# Patient Record
Sex: FEMALE | URBAN - METROPOLITAN AREA
[De-identification: names, ages, dates, MRNs, and addresses within clinical notes are randomized per-mention and may not be internally consistent; named-entity substitution may affect disease eponyms.]

---

## 2021-10-25 ENCOUNTER — HISTORICAL (OUTPATIENT)
Dept: ADMINISTRATIVE | Facility: HOSPITAL | Age: 51
End: 2021-10-25

## 2021-10-25 LAB
ABS NEUT (OLG): 1.92 X10(3)/MCL (ref 2.1–9.2)
ALBUMIN SERPL-MCNC: 3.5 GM/DL (ref 3.5–5)
ALBUMIN/GLOB SERPL: 1.1 RATIO (ref 1.1–2)
ALP SERPL-CCNC: 91 UNIT/L (ref 40–150)
ALT SERPL-CCNC: 11 UNIT/L (ref 0–55)
AST SERPL-CCNC: 15 UNIT/L (ref 5–34)
BASOPHILS # BLD AUTO: 0 X10(3)/MCL (ref 0–0.2)
BASOPHILS NFR BLD AUTO: 1 %
BILIRUB SERPL-MCNC: 0.2 MG/DL
BILIRUBIN DIRECT+TOT PNL SERPL-MCNC: 0.1 MG/DL (ref 0–0.5)
BILIRUBIN DIRECT+TOT PNL SERPL-MCNC: 0.1 MG/DL (ref 0–0.8)
BUN SERPL-MCNC: 10.6 MG/DL (ref 9.8–20.1)
CALCIUM SERPL-MCNC: 8.9 MG/DL (ref 8.7–10.5)
CHLORIDE SERPL-SCNC: 108 MMOL/L (ref 98–107)
CHOLEST SERPL-MCNC: 164 MG/DL
CHOLEST/HDLC SERPL: 3 {RATIO} (ref 0–5)
CO2 SERPL-SCNC: 28 MMOL/L (ref 22–29)
CREAT SERPL-MCNC: 0.73 MG/DL (ref 0.55–1.02)
DEPRECATED CALCIDIOL+CALCIFEROL SERPL-MC: 26 NG/ML (ref 30–80)
EOSINOPHIL # BLD AUTO: 0.1 X10(3)/MCL (ref 0–0.9)
EOSINOPHIL NFR BLD AUTO: 3 %
ERYTHROCYTE [DISTWIDTH] IN BLOOD BY AUTOMATED COUNT: 13.3 % (ref 11.5–14.5)
EST CREAT CLEARANCE SER (OHS): 69.39 ML/MIN
EST. AVERAGE GLUCOSE BLD GHB EST-MCNC: 105.4 MG/DL
GLOBULIN SER-MCNC: 3.2 GM/DL (ref 2.4–3.5)
GLUCOSE SERPL-MCNC: 63 MG/DL (ref 74–100)
HBA1C MFR BLD: 5.3 %
HCT VFR BLD AUTO: 41.9 % (ref 35–46)
HDLC SERPL-MCNC: 56 MG/DL (ref 35–60)
HGB BLD-MCNC: 13.4 GM/DL (ref 12–16)
LDLC SERPL CALC-MCNC: 88 MG/DL (ref 50–140)
LYMPHOCYTES # BLD AUTO: 1.9 X10(3)/MCL (ref 0.6–4.6)
LYMPHOCYTES NFR BLD AUTO: 42 %
MCH RBC QN AUTO: 29.5 PG (ref 26–34)
MCHC RBC AUTO-ENTMCNC: 32 GM/DL (ref 31–37)
MCV RBC AUTO: 92.1 FL (ref 80–100)
MONOCYTES # BLD AUTO: 0.6 X10(3)/MCL (ref 0.1–1.3)
MONOCYTES NFR BLD AUTO: 13 %
NEUTROPHILS # BLD AUTO: 1.92 X10(3)/MCL (ref 2.1–9.2)
NEUTROPHILS NFR BLD AUTO: 42 %
NRBC BLD AUTO-RTO: 0 % (ref 0–0.2)
PLATELET # BLD AUTO: 214 X10(3)/MCL (ref 130–400)
PMV BLD AUTO: 10.9 FL (ref 7.4–10.4)
POTASSIUM SERPL-SCNC: 4.3 MMOL/L (ref 3.5–5.1)
PROT SERPL-MCNC: 6.7 GM/DL (ref 6.4–8.3)
RBC # BLD AUTO: 4.55 X10(6)/MCL (ref 4–5.2)
SODIUM SERPL-SCNC: 140 MMOL/L (ref 136–145)
T4 FREE SERPL-MCNC: 0.74 NG/DL (ref 0.7–1.48)
TRIGL SERPL-MCNC: 99 MG/DL (ref 37–140)
TSH SERPL-ACNC: 1.34 UIU/ML (ref 0.35–4.94)
VLDLC SERPL CALC-MCNC: 20 MG/DL
WBC # SPEC AUTO: 4.6 X10(3)/MCL (ref 4.5–11)

## 2022-04-10 ENCOUNTER — HISTORICAL (OUTPATIENT)
Dept: ADMINISTRATIVE | Facility: HOSPITAL | Age: 52
End: 2022-04-10

## 2022-04-26 VITALS
OXYGEN SATURATION: 98 % | SYSTOLIC BLOOD PRESSURE: 120 MMHG | HEIGHT: 61 IN | DIASTOLIC BLOOD PRESSURE: 71 MMHG | BODY MASS INDEX: 40.2 KG/M2 | WEIGHT: 212.94 LBS

## 2022-05-03 NOTE — HISTORICAL [facility] CERNER
This is a historical note converted from [facility]. Formatting and pictures may have been removed.  Please reference [facility] for original formatting and attached multimedia. Chief Complaint  EST pcp, Tdap, Shingrix  History of Present Illness  51-year-old?[place]-speaking female presents to clinic to establish PCP   line used for duration of interview and physical  ?  ?  Asthma  States that she is originally from [place] and has not had any asthma attacks since moving to this country.  Was diagnosed with asthma as a child and had an albuterol inhaler  Would like to have inhaler to keep on hand just in case  ?  Hemorrhoids  Follows with Dr. Chato Lozada and had recent C-scope  Does have family history of colon cancer with her father having passed away at 83 from colon cancer  States that she is on MiraLAX as needed constipation?prescribed by Dr. Lozada  ?  Minor swelling on ankles  Noticed it several years ago?swelling is?often noticed after she has been sitting down all day at her job  No shortness of breath, no chest pain, not know if swelling relieves when she elevates her feet  States that she would like a checkup as she has not been to a doctor for checkup in several years  Did have question if there was a test for tuberculosis or a vaccine  Denies any current or former exposure to?tuberculosis  And declined PPD test today  ?  Obesity  Patient states that she has started to walk after work  Is trying to eat better  Would like referral to medical nutrition therapy  ?  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  HM  Shingles vaccine 10/25/2021 second dose in two to 6 months  Tetanus vaccine today  Review of Systems  Constitutional: no fever, no chills, no weight loss  CV: , no edema, no chest pain, no  palpitations  ENT: no cough, no nasal congestion  :?No dysuria  GI: no constipation, no diarrhea, no nausea, no vomiting  Resp: no SOB, no wheezing, no difficulty breathing  Skin: no rash, no wounds, no discolorations  Neuro: no numbness/tingling, , no syncope  MSK: , no limb deformities, no gait disturbances, no neck pain  Psych: no depression, no anxiety  Physical Exam  Vitals & Measurements  T:?36.6? ?C (Oral)? HR:?79(Peripheral)? RR:?20? BP:?123/83? SpO2:?99%?  HT:?156.00?cm? WT:?96.000?kg? BMI:?39.45? LMP:?10/02/2021 00:00 CDT?  General-well appearing, NAD, wearing mask  Eye_- PERRL, EOMI, no scleral icterus  ENMT-deferred  Respiratory- CTAB, no rhonchi or stridor noted  Cardiovascular- RRR, no murmurs auscultated. non pitting edema to bilateral ankles only on lateral malleolus  Gastrointestinal- abdomen soft, Non tender, +BS  Genitourinary- deferred  Hema/Lymph- no lymphadenopathy noted in auricular, axillary  Musculoskeletal-+AROM  Integumentary- warm, dry ,intact  Neurologic- cooperative  Assessment/Plan  Immunization due?Z23,?Immunization due?Z23,?Immunization due?Z23,?Immunization due?Z23,?Immunization due?Z23,?Immunization due?Z23,?Immunization due?Z23,?Immunization due?Z23  ?Shingles #1 and tetanus vaccine today  Ordered:  tetanus/diphth/pertuss (Tdap) adult/adol, 0.5 mL, form: Injection, IM, Once-Unscheduled, first dose 10/25/21 14:43:00 CDT  CBC w/ Auto Diff, Routine collect, 10/25/21 15:28:00 CDT, Blood, Stop date 10/25/21 15:28:00 CDT, Lab Collect, Venous Draw, Immunization due  Obesity (BMI 30-39.9)  Wellness examination, 10/25/21 14:42:00 CDT  Clinic Follow up, *Est. 11/25/21 3:00:00 CST, lab results, Order for future visit, Immunization due  Obesity (BMI 30-39.9)  Wellness examination, [facility]  Comprehensive Metabolic Panel, Routine collect, 10/25/21 15:28:00 CDT, Blood, Stop date 10/25/21 15:28:00 CDT, Lab Collect, Venous Draw, Immunization due  Obesity (BMI 30-39.9)   Wellness examination, 10/25/21 14:42:00 CDT  Free T4, Routine collect, 10/25/21 15:28:00 CDT, Blood, Stop date 10/25/21 15:28:00 CDT, Lab Collect, Venous Draw, Immunization due  Obesity (BMI 30-39.9)  Wellness examination, 10/25/21 14:42:00 CDT  Hemoglobin A1C UHC, Routine collect, 10/25/21 15:28:00 CDT, Blood, Stop date 10/25/21 15:28:00 CDT, Lab Collect, Venous Draw, Immunization due  Obesity (BMI 30-39.9)  Wellness examination, 10/25/21 14:42:00 CDT  Lipid Panel, Routine collect, 10/25/21 15:28:00 CDT, Blood, Stop date 10/25/21 15:28:00 CDT, Lab Collect, Venous Draw, Immunization due  Obesity (BMI 30-39.9)  Wellness examination, 10/25/21 14:42:00 CDT  Thyroid Stimulating Hormone, Routine collect, 10/25/21 15:28:00 CDT, Blood, Stop date 10/25/21 15:28:00 CDT, Lab Collect, Venous Draw, Immunization due  Obesity (BMI 30-39.9)  Wellness examination, 10/25/21 14:42:00 CDT  Vaccines Add, 10/25/21 15:19:00 CDT, [facility], Routine, 10/25/21 15:19:00 CDT, Immunization due  Vaccines initial, 10/25/21 15:18:00 CDT, [facility], Routine, 10/25/21 15:18:00 CDT, Immunization due  Obesity (BMI 30-39.9)  Wellness examination  Vitamin D, 25-Hydroxy Level, Routine collect, 10/25/21 15:28:00 CDT, Blood, Stop date 10/25/21 15:28:00 CDT, Lab Collect, Venous Draw, Immunization due  Obesity (BMI 30-39.9)  Wellness examination, 10/25/21 14:42:00 CDT  ?  Obesity (BMI 30-39.9)?E66.9,?Obesity (BMI 30-39.9)?E66.9,?Obesity (BMI 30-39.9)?E66.9,?Obesity (BMI 30-39.9)?E66.9,?Obesity (BMI 30-39.9)?E66.9,?Obesity (BMI 30-39.9)?E66.9,?Obesity (BMI 30-39.9)?E66.9,?Obesity (BMI 30-39.9)?E66.9  ?A1c, lipid panel, TSH, T4, CMP  Referral placed to medical nutrition therapy  Advise diet and continued exercise  Ordered:  CBC w/ Auto Diff, Routine collect, 10/25/21 15:28:00 CDT, Blood, Stop date 10/25/21 15:28:00 CDT, Lab Collect, Venous Draw, Immunization due  Obesity (BMI 30-39.9)  Wellness examination, 10/25/21  14:42:00 CDT  Clinic Follow up, *Est. 11/25/21 3:00:00 CST, lab results, Order for future visit, Immunization due  Obesity (BMI 30-39.9)  Wellness examination, [facility]  Comprehensive Metabolic Panel, Routine collect, 10/25/21 15:28:00 CDT, Blood, Stop date 10/25/21 15:28:00 CDT, Lab Collect, Venous Draw, Immunization due  Obesity (BMI 30-39.9)  Wellness examination, 10/25/21 14:42:00 CDT  Free T4, Routine collect, 10/25/21 15:28:00 CDT, Blood, Stop date 10/25/21 15:28:00 CDT, Lab Collect, Venous Draw, Immunization due  Obesity (BMI 30-39.9)  Wellness examination, 10/25/21 14:42:00 CDT  Hemoglobin A1C UHC, Routine collect, 10/25/21 15:28:00 CDT, Blood, Stop date 10/25/21 15:28:00 CDT, Lab Collect, Venous Draw, Immunization due  Obesity (BMI 30-39.9)  Wellness examination, 10/25/21 14:42:00 CDT  Lipid Panel, Routine collect, 10/25/21 15:28:00 CDT, Blood, Stop date 10/25/21 15:28:00 CDT, Lab Collect, Venous Draw, Immunization due  Obesity (BMI 30-39.9)  Wellness examination, 10/25/21 14:42:00 CDT  Office/Outpatient Visit Level 4 [address], Wellness examination  Obesity (BMI 30-39.9), [facility], 10/25/21 14:42:00 CDT  Thyroid Stimulating Hormone, Routine collect, 10/25/21 15:28:00 CDT, Blood, Stop date 10/25/21 15:28:00 CDT, Lab Collect, Venous Draw, Immunization due  Obesity (BMI 30-39.9)  Wellness examination, 10/25/21 14:42:00 CDT  Vaccines initial, 10/25/21 15:18:00 CDT, [facility], Routine, 10/25/21 15:18:00 CDT, Immunization due  Obesity (BMI 30-39.9)  Wellness examination  Vitamin D, 25-Hydroxy Level, Routine collect, 10/25/21 15:28:00 CDT, Blood, Stop date 10/25/21 15:28:00 CDT, Lab Collect, Venous Draw, Immunization due  Obesity (BMI 30-39.9)  Wellness examination, 10/25/21 14:42:00 CDT  ?  Wellness examination?Z00.00,?Wellness examination?Z00.00,?Wellness examination?Z00.00,?Wellness examination?Z00.00,?Wellness examination?Z00.00,?Wellness  examination?Z00.00,?Wellness examination?Z00.00,?Wellness examination?Z00.00  ?See above  Patient reports having colonoscopy and mammograms  Release of information given to patient to obtain records  ?  ?  ?  Asthma  albuterol inhaler ordered  Ordered:  CBC w/ Auto Diff, Routine collect, 10/25/21 15:28:00 CDT, Blood, Stop date 10/25/21 15:28:00 CDT, Lab Collect, Venous Draw, Immunization due  Obesity (BMI 30-39.9)  Wellness examination, 10/25/21 14:42:00 CDT  Clinic Follow up, *Est. 11/25/21 3:00:00 CST, lab results, Order for future visit, Immunization due  Obesity (BMI 30-39.9)  Wellness examination, [facility]  Comprehensive Metabolic Panel, Routine collect, 10/25/21 15:28:00 CDT, Blood, Stop date 10/25/21 15:28:00 CDT, Lab Collect, Venous Draw, Immunization due  Obesity (BMI 30-39.9)  Wellness examination, 10/25/21 14:42:00 CDT  Free T4, Routine collect, 10/25/21 15:28:00 CDT, Blood, Stop date 10/25/21 15:28:00 CDT, Lab Collect, Venous Draw, Immunization due  Obesity (BMI 30-39.9)  Wellness examination, 10/25/21 14:42:00 CDT  Hemoglobin A1C UHC, Routine collect, 10/25/21 15:28:00 CDT, Blood, Stop date 10/25/21 15:28:00 CDT, Lab Collect, Venous Draw, Immunization due  Obesity (BMI 30-39.9)  Wellness examination, 10/25/21 14:42:00 CDT  Lipid Panel, Routine collect, 10/25/21 15:28:00 CDT, Blood, Stop date 10/25/21 15:28:00 CDT, Lab Collect, Venous Draw, Immunization due  Obesity (BMI 30-39.9)  Wellness examination, 10/25/21 14:42:00 CDT  Office/Outpatient Visit [place] 4 [place] [id number] , Wellness examination  Obesity (BMI 30-39.9), [facility], 10/25/21 14:42:00 CDT  Thyroid Stimulating Hormone, Routine collect, 10/25/21 15:28:00 CDT, Blood, Stop date 10/25/21 15:28:00 CDT, Lab Collect, Venous Draw, Immunization due  Obesity (BMI 30-39.9)  Wellness examination, 10/25/21 14:42:00 CDT  Vaccines initial, 10/25/21 15:18:00 CDT, [facility], Routine, 10/25/21 15:18:00 CDT,  Immunization due  Obesity (BMI 30-39.9)  Wellness examination  Vitamin D, 25-Hydroxy Level, Routine collect, 10/25/21 15:28:00 CDT, Blood, Stop date 10/25/21 15:28:00 CDT, Lab Collect, Venous Draw, Immunization due  Obesity (BMI 30-39.9)  Wellness examination, 10/25/21 14:42:00 CDT  ?  RTC 1 month  Referrals  [facility] Internal Referral to Medical Nutrition Therapy Education Clinic, Specialty: Medical Nutrition Therapy, Reason: obesity medical nutrition, Refer To: Therapy, Medical Nutrition, [facility], [address] [facility], [id number]., Start: 10/25/21 14:44:00 CDT, Service By: 11/13/21  Clinic Follow up, *Est. 11/25/21 3:00:00 CST, lab results, Order for future visit, Immunization due  Obesity (BMI 30-39.9)  Wellness examination, [facility]   Procedure/Surgical History  Gallbladder operation (2012)   Medications  Albuterol (Eqv-Proventil HFA) 90 mcg/inh inhalation aerosol, 2 puff(s), INH, q6hr, 6 refills  tetanus/diphth/pertuss (Tdap) adult/adol 5 units-2.5 units-18.5 mcg/0.5 mL intramuscular suspension, 0.5 mL, IM, Once-Unscheduled  Allergies  No Known Allergies  No Known Medication Allergies  Family History  Hypertension.: Mother.  Immunizations  Vaccine Date Status   zoster vaccine, inactivated 10/25/2021 Given   tetanus/diphtheria/pertussis, acel(Tdap) 10/25/2021 Given   COVID-19 MRNA, LNP-S, PF- Pfizer 04/22/2021 Given   COVID-19 MRNA, LNP-S, PF- Pfizer 04/01/2021 Given   influenza virus vaccine, inactivated 10/10/2017 Recorded   influenza virus vaccine, inactivated 10/05/2016 Recorded   influenza virus vaccine, inactivated 10/23/2015 Recorded